# Patient Record
Sex: FEMALE | Race: WHITE | Employment: OTHER | URBAN - METROPOLITAN AREA
[De-identification: names, ages, dates, MRNs, and addresses within clinical notes are randomized per-mention and may not be internally consistent; named-entity substitution may affect disease eponyms.]

---

## 2017-05-19 PROCEDURE — 84075 ASSAY ALKALINE PHOSPHATASE: CPT | Performed by: INTERNAL MEDICINE

## 2017-05-19 PROCEDURE — 83516 IMMUNOASSAY NONANTIBODY: CPT | Performed by: INTERNAL MEDICINE

## 2017-05-19 PROCEDURE — 84080 ASSAY ALKALINE PHOSPHATASES: CPT | Performed by: INTERNAL MEDICINE

## 2017-07-06 PROCEDURE — 82104 ALPHA-1-ANTITRYPSIN PHENO: CPT | Performed by: INTERNAL MEDICINE

## 2017-07-06 PROCEDURE — 82390 ASSAY OF CERULOPLASMIN: CPT | Performed by: INTERNAL MEDICINE

## 2017-07-06 PROCEDURE — 82103 ALPHA-1-ANTITRYPSIN TOTAL: CPT | Performed by: INTERNAL MEDICINE

## 2017-07-06 PROCEDURE — 83516 IMMUNOASSAY NONANTIBODY: CPT | Performed by: INTERNAL MEDICINE

## 2017-07-14 PROCEDURE — 82607 VITAMIN B-12: CPT | Performed by: OTHER

## 2017-07-14 PROCEDURE — 80184 ASSAY OF PHENOBARBITAL: CPT | Performed by: OTHER

## 2017-07-18 PROBLEM — Z86.73 H/O: CVA (CEREBROVASCULAR ACCIDENT): Status: ACTIVE | Noted: 2017-07-18

## 2017-11-27 PROBLEM — K86.2 PANCREATIC CYST: Status: ACTIVE | Noted: 2017-11-27

## 2019-01-25 PROBLEM — R74.01 TRANSAMINITIS: Status: ACTIVE | Noted: 2019-01-25

## 2019-01-25 PROBLEM — Z87.898 HISTORY OF VERTIGO: Status: ACTIVE | Noted: 2019-01-25

## 2019-09-20 PROBLEM — Z79.01 ANTICOAGULATED ON COUMADIN: Status: ACTIVE | Noted: 2019-09-20

## 2020-01-16 PROBLEM — I48.21 PERMANENT ATRIAL FIBRILLATION (HCC): Status: ACTIVE | Noted: 2020-01-16

## 2020-10-23 PROBLEM — D69.2 PURPURA (HCC): Status: ACTIVE | Noted: 2020-10-23

## 2021-02-01 ENCOUNTER — LAB ENCOUNTER (OUTPATIENT)
Dept: LAB | Age: 81
End: 2021-02-01
Attending: INTERNAL MEDICINE
Payer: MEDICARE

## 2021-02-01 DIAGNOSIS — Z01.818 PRE-OP TESTING: ICD-10-CM

## 2021-02-02 LAB — SARS-COV-2 RNA RESP QL NAA+PROBE: NOT DETECTED

## 2021-02-04 ENCOUNTER — ANESTHESIA EVENT (OUTPATIENT)
Dept: ENDOSCOPY | Facility: HOSPITAL | Age: 81
End: 2021-02-04
Payer: MEDICARE

## 2021-02-04 ENCOUNTER — ANESTHESIA (OUTPATIENT)
Dept: ENDOSCOPY | Facility: HOSPITAL | Age: 81
End: 2021-02-04
Payer: MEDICARE

## 2021-02-04 ENCOUNTER — HOSPITAL ENCOUNTER (OUTPATIENT)
Facility: HOSPITAL | Age: 81
Setting detail: HOSPITAL OUTPATIENT SURGERY
Discharge: HOME | End: 2021-02-04
Attending: INTERNAL MEDICINE | Admitting: INTERNAL MEDICINE
Payer: MEDICARE

## 2021-02-04 DIAGNOSIS — Z01.818 PRE-OP TESTING: Primary | ICD-10-CM

## 2021-02-04 DIAGNOSIS — K86.2 PANCREAS CYST: ICD-10-CM

## 2021-02-04 PROCEDURE — 0DJ08ZZ INSPECTION OF UPPER INTESTINAL TRACT, VIA NATURAL OR ARTIFICIAL OPENING ENDOSCOPIC: ICD-10-PCS | Performed by: INTERNAL MEDICINE

## 2021-02-04 RX ORDER — SODIUM CHLORIDE, SODIUM LACTATE, POTASSIUM CHLORIDE, CALCIUM CHLORIDE 600; 310; 30; 20 MG/100ML; MG/100ML; MG/100ML; MG/100ML
INJECTION, SOLUTION INTRAVENOUS CONTINUOUS
Status: DISCONTINUED | OUTPATIENT
Start: 2021-02-04 | End: 2021-02-04

## 2021-02-04 RX ORDER — LIDOCAINE HYDROCHLORIDE 10 MG/ML
INJECTION, SOLUTION EPIDURAL; INFILTRATION; INTRACAUDAL; PERINEURAL AS NEEDED
Status: DISCONTINUED | OUTPATIENT
Start: 2021-02-04 | End: 2021-02-04 | Stop reason: SURG

## 2021-02-04 RX ORDER — NALOXONE HYDROCHLORIDE 0.4 MG/ML
80 INJECTION, SOLUTION INTRAMUSCULAR; INTRAVENOUS; SUBCUTANEOUS AS NEEDED
Status: DISCONTINUED | OUTPATIENT
Start: 2021-02-04 | End: 2021-02-04

## 2021-02-04 RX ADMIN — SODIUM CHLORIDE, SODIUM LACTATE, POTASSIUM CHLORIDE, CALCIUM CHLORIDE: 600; 310; 30; 20 INJECTION, SOLUTION INTRAVENOUS at 12:12:00

## 2021-02-04 RX ADMIN — LIDOCAINE HYDROCHLORIDE 50 MG: 10 INJECTION, SOLUTION EPIDURAL; INFILTRATION; INTRACAUDAL; PERINEURAL at 12:16:00

## 2021-02-04 RX ADMIN — SODIUM CHLORIDE, SODIUM LACTATE, POTASSIUM CHLORIDE, CALCIUM CHLORIDE: 600; 310; 30; 20 INJECTION, SOLUTION INTRAVENOUS at 12:22:00

## 2021-02-04 NOTE — H&P
[address] [facility] Department of  Gastroenterology  Update Health History :       Freeman Favorite  female   Melodie Corona MD     [id number]  8/8/1940 Primary Care Physician  Judson Honeycutt MD        HPI :  Pancreas cysts.     Patient History:  Past Medic 2011    benign cyst removed    • OTHER SURGICAL HISTORY  09/22/10    Left knee scope   • OTHER SURGICAL HISTORY      craniotomies 1960, 1961   • REMOVAL GALLBLADDER     • REMOVAL OF TONSILS,12+ Y/O     • SKIN SURGERY  8/28/14    Slow Mohs for evolving LM t Rfl: 3    •  bumetanide 2 MG Oral Tab, TAKE ONE TABLET (2 MG) IN THE MORNING AND HALF TAB (1 MG) AT NIGHT, Disp: 135 tablet, Rfl: 3    •  GABAPENTIN 300 MG Oral Cap, TAKE 1 CAPSULE BY MOUTH TWICE A DAY, Disp: 180 capsule, Rfl: 3    •  Meclizine HCl 25 MG O questions were answered, patient verbalized understanding and agreed to proceed with procedure as scheduled.       Socorro Hampton MD

## 2021-02-04 NOTE — OPERATIVE REPORT
OPERATIVE REPORT   PATIENT NAME: Chuck Quintana  MRN: [id number]  DATE OF OPERATION: 2/4/2021  PREOPERATIVE DIAGNOSIS:   1. Pancreas neck cyst  POSTOPERATIVE DIAGNOSES:  1.  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

## 2021-02-04 NOTE — ANESTHESIA POSTPROCEDURE EVALUATION
Patient: Eloina Banks    Procedure Summary     Date: 02/04/21 Room / Location: [address] [place] [address] ENDOSCOPY 01 / [address] [place] [address] ENDOSCOPY    Anesthesia Start: 8706 Anesthesia Stop: 2304    Procedure: ENDOSCOPIC ULTRASOUND (EUS) (N/A ) Diagnosis:       Pancreas cyst      (pancrea

## 2021-02-04 NOTE — ANESTHESIA PREPROCEDURE EVALUATION
Anesthesia PreOp Note    HPI:     Hollie Gamble is a [de-identified]year old female who presents for preoperative consultation requested by: Libertad Campuzano MD    Date of Surgery: 2/4/2021    Procedure(s):  ENDOSCOPIC ULTRASOUND (EUS)  Indication: Pancreas cyst History:   Diagnosis Date   • Actinic keratosis    • Atrial fibrillation (HCC)    • BPPV (benign paroxysmal positional vertigo) 7/30/2013   • Elevated liver enzymes 08/17/2014    workup 2017; most likely 2' fatty liver   • H/O absence seizures    • H/O ret the L upper cheek   • SKIN SURGERY  9-10-14    Slow mohs stage 2 to left upper cheek-margins +at 9-12   • SPECIAL SERVICE OR REPORT  x 2    s/p craniotomy   • TONSILLECTOMY     • TOTAL ABDOM HYSTERECTOMY           •  CALCIUM OR, Take by mouth., Disp: , Rfl Sodium 100 MCG Oral Tab, Take 1 tablet (100 mcg total) by mouth once daily. , Disp: 90 tablet, Rfl: 3      No current [facility]-ordered facility-administered medications on file. No current [facility]-ordered outpatient medications on file.         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

## 2021-02-05 VITALS
HEIGHT: 65 IN | RESPIRATION RATE: 19 BRPM | DIASTOLIC BLOOD PRESSURE: 59 MMHG | WEIGHT: 192 LBS | HEART RATE: 68 BPM | BODY MASS INDEX: 31.99 KG/M2 | OXYGEN SATURATION: 94 % | TEMPERATURE: 97 F | SYSTOLIC BLOOD PRESSURE: 136 MMHG

## 2021-11-17 PROBLEM — S22.080S COMPRESSION FRACTURE OF T12 VERTEBRA, SEQUELA: Status: ACTIVE | Noted: 2021-11-17

## (undated) DEVICE — CONMED SCOPE SAVER BITE BLOCK, 20X27 MM: Brand: SCOPE SAVER

## (undated) DEVICE — LINE MNTR ADLT SET O2 INTMD

## (undated) DEVICE — CANNULA NASAL 02/C02 ADULT

## (undated) DEVICE — Device

## (undated) DEVICE — Device: Brand: DEFENDO AIR/WATER/SUCTION AND BIOPSY VALVE

## (undated) DEVICE — MEDI-VAC NON-CONDUCTIVE SUCTION TUBING 6MM X 1.8M (6FT.) L: Brand: CARDINAL HEALTH

## (undated) DEVICE — Device: Brand: CUSTOM PROCEDURE KIT